# Patient Record
Sex: FEMALE | Race: BLACK OR AFRICAN AMERICAN | Employment: FULL TIME | URBAN - METROPOLITAN AREA
[De-identification: names, ages, dates, MRNs, and addresses within clinical notes are randomized per-mention and may not be internally consistent; named-entity substitution may affect disease eponyms.]

---

## 2019-11-06 ENCOUNTER — HOSPITAL ENCOUNTER (OUTPATIENT)
Dept: NUTRITION | Age: 59
Discharge: HOME | End: 2019-11-06
Payer: COMMERCIAL

## 2019-11-06 PROCEDURE — 97802 MEDICAL NUTRITION INDIV IN: CPT

## 2019-11-06 NOTE — PROGRESS NOTES
[address] [place] [address] 51, 45 [facility], [address] [place] [address]  Phone: [phone]  Fax: [phone]   Nutrition Assessment  Medical Nutrition Therapy   Outpatient Initial Evaluation         Patient Name: Jarrett Blount : 1960   Treatment Diagnosis: DM2, obesity   Referral Source: [facility]): 2019     Gender: female Age: 62 y.o. Ht: 65 in Wt: 271.6  lb     BMI: 45.3 BMR   Male  BMR Female 1804   Anthropometrics Assessment: Morbid obesity     Past Medical History includes: DM2,      Pertinent Medications:   Metformin, trulicity, glimiperide     Biochemical Data:   No results found for: HBA1C, HGBE8, NEI2TKAX, QYZ2PMEC  No results found for: CHOL, CHOLPOCT, CHOLX, CHLST, CHOLV, HDL, HDLPOC, HDLP, LDL, LDLCPOC, LDLC, DLDLP, VLDLC, VLDL, TGLX, TRIGL, TRIGP, TGLPOCT, CHHD, CHHDX  No results found for: GPT, ALT, SGOT, GGT, GGTP, AP, APIT, APX, CBIL, TBIL, TBILI  No results found for: NERISSA, CREAPOC, ACREA, CREA, REFC3, REFC4  No results found for: BUN, BUNPOC, IBUN, MBUNV, BUNV  No results found for: MCACR, MCA1, MCA2, MCA3, MCAU, MCAU2, MCALPOCT     Subjective/Assessment:   Patient seen today in nutrition for initial assessment for diabetes education. Patient reports in the last 6 months gaining about ~15 pounds and her A1C increasing from 6.9-7.4. She reports this increase is due to her doctor stopping a diabetes medication, jardiance, due to it causing yeast infections. Today our goal is to get her A1C back below 6.9 and to lose weight to initial goal of 200 pounds. She normally does not check her blood sugar. But if she does lately it has been in the 150's fasting in the morning. Nutrition Diagnosis:  Excessive carbohydrate intake related to food and nutrition related knowledge deficit as evidenced by elevated A1C.     Nutrition Diagnosis: Obesity related to excessive energy intake and physical inactivity as evidenced by elevated BMI, over consumption of calories and infrequent physical activity. Current Eating Patterns: Diet recall:  Breakfast: 16 oz hot chocolate, vasques and egg sandwich with 2 slices bread  Lunch: (12:00) PBJ sandwich and cup fruit  Snack: normally nothing unless she feels her blood sugar is dropping and then she will have a cup of fruit  Dinner: (9:00) fast food: 2 fish sandwiches with only 1 of the buns or chick umang chicken noodle soup or panera salad with chicken  Snack: occasionally she will have fruit and or crackers    On the weekend she eats out for breakfast and normally 1 other meal at least 1 day. She still eats 3 meals per day. Walking occasionally but limited due to joint pain. Fluids: water, hot chocolate and occasionally soda  Lactose intolerant  Supplements: vitamin D weekly       Estimate Needs   Calories:  1800 Protein: 60-80 Carbs: 120     Kcal/day  g/day  g/day      Education provided:   Intervention:  Education, Comprehensive - reviewed prescription below. Nutrition education provided on medical nutrition therapy for type 2 Diabetes Mellitus. Educated patient on pathophysiology of Type II diabetes and the rational for dietary modifications to include more fiber, protein and modified carbohydrate consumption. Provided education on food label reading, food groups (carbohydrates, proteins and non-starchy vegetables), and meal planning for diabetes control. Individual carbohydrate goals were assessed and provided to the patient. We discussed the importance of having a protein source with each meal and snack to help with blood sugar control and hunger. Meal and snack times were discussed. Encouraged patient to eat within 2 hours of waking and not go longer than 4-5 hours between meals. Reviewed sample meal plan, tips for eating out and grocery shopping. The patient was provided with my contact info. Patient asked questions and indicated understanding.        Handouts Provided: [x]  Carbohydrates  [x]  Protein  [x]  Fiber  [x]  Serving Sizes  [x]  Meal Ideas  [x]  Non-starchy Vegetables []  Diabetes  []  Cholesterol  []  Sodium  [x]  Food labels  []  Snacks  []  Others:   Information Reviewed with: Patient   Readiness to Change Stage: []  Pre-contemplative    [x]  Contemplative  []  Preparation               []  Action                  []  Maintenance   Potential Barriers to Learning: []  Decline in memory    []  Language barrier   []  Other:  []  Emotional                  []  Limited mobility  []  Lack of motivation     [] Vision, hearing or cognitive impairment   Expected Compliance: Good      Nutritional Goal - To promote lifestyle changes to result in:    [x]  Weight loss  [x]  Improved diabetic control  []  Decreased cholesterol levels  []  Decreased blood pressure  []  Weight maintenance []  Preventing any interactions associated with food allergies  []  Adequate weight gain toward goal weight  []  Other:        Nutrition Prescription/  Patient Goals:    Limit intake of carbohydrates to no more than. o 120 grams of carbohydrate per day. o 30-45 grams of carbohydrate per meal.  o 0-15 grams of carbohydrate per snack.  Have 3 meals per day and 1-2 snacks (as needed).  Aim for at least 3 ounces (deck of cards) of a protein choice at every meal and 1-2 ounces at snack times.  Choose one or more vegetables to include with lunch, dinner and snacks (as needed).  Have at least 64 ounces of fluids per day. Choose sugar-free beverages only.       Exercise: 5 minutes 3-5 days per week     Change to sugar free hot chocolate   Add in afternoon snack (protein and vegetable and 1 carb serving if needed)     Dietitian Signature: Violette Lozano MS, RD Date: 11/6/2019   Follow-up: December 11 th at 2:00 at [facility] Time: 3:35 PM

## 2019-11-13 ENCOUNTER — HOSPITAL ENCOUNTER (OUTPATIENT)
Dept: NUTRITION | Age: 59
Discharge: HOME | End: 2019-11-13
Payer: COMMERCIAL

## 2019-11-13 PROCEDURE — G0109 DIAB MANAGE TRN IND/GROUP: HCPCS

## 2019-11-20 ENCOUNTER — HOSPITAL ENCOUNTER (OUTPATIENT)
Dept: NUTRITION | Age: 59
Discharge: HOME | End: 2019-11-20
Payer: COMMERCIAL

## 2019-11-20 PROCEDURE — G0109 DIAB MANAGE TRN IND/GROUP: HCPCS

## 2019-11-21 NOTE — PROGRESS NOTES
[employer] [facility] Outpatient Diabetes Self-Management Education Program      Dsme RECORD and PROGRESS NOTE         Patient: Aria Dockery : 1960         Physician: BRENDA Gould Date: 2019     VISIT No: []1 [x]2 []3 []4 []5 []6               [x] Initial                   [] Subsequent Year ACTUAL hours furnished: PROGRESS NOTE    A1C:   Date A1C drawn:        [] Initial     [] Updated    Height:   Weight:                 Date:   BMI:        [] Initial     [] Updated                   Weight Change:  [] Up    [] Down   lbs (Only for follow-up visit)     Diabetes Meds:  [] None   [x] List: (Provide list at initial visit only list changes at follow-up visits)  [] No change     Metformin, Trulicity, Glymperide  Other Meds:  [x] BP   [x] lipids          [x] depression/anxiety        [] other:        Behavior Goals Set Related To:   (List at initial visit only and add any new goals, if any, from follow-up visits)  [] Healthy eating      [] Being active  [] Taking meds        [] Monitoring   [] Reducing risks     [] Healthy coping  [] Problem-solving  []  Other:    [] Separate Behavior Form Attached (use to assess progress on previously set goals)      [x] Reviewed achievement of goals    Notes: Pt attended Class II of the 3 class DSME series. Pt remains engaged and participated in discussions. Pt will attend Class 3 on Wednesday, 2019.                  [] Program schedule and visit dates                  [] 0.5     [] 1    [] 1.5    [x] 2     [] 2.5     [] 3    Format:   [] Individual       [x] Group    Also present:   []  Family:     [] Friend:     [] Caregiver:        [] Other:                  [] Behavior goals:              [] set goals  [] review achievement     [] Prediabetes and Diabetes biology & diagnosis     [] BG goals and targets        [] A1c test, meaning and goal     [] Diabetes causes & risk factors        [] Purpose of Healthy Eating & tips     [] Body weight: [] healthy wt             [] risks when overwt/obese     [] Carbohydrate food sources & counting     [] Lean proteins & healthy fats     [] How foods/drinks affect BG     [] How to read a Food Label     [] Healthy Plate Method     [] Benefits of Being Active     [] Different types of exercise     [] Recommendations & safety issues for exercise     [x] Diabetes identification (on body, in wallet/purse)     [x] Obstacles to behavior change     [x] S.M.A.R.T.  Goals     [x] Taking medications:      [x] oral   [x]  injections   [x] insulin     [x] Understanding types of meds     [x] Proper storage and handling of meds     [x] OTC and alternative meds     [x] Purpose & benefits of Monitoring     [x] Causes, prevention & treatment for hypo- & hyperglycemia     [x] General guidelines for monitoring     [x] SMBG and monitoring schedule     [] BG logs       [] reviewed logs     [x] Pattern management with SMBG log     [] BG meter receipt              Type/name of:                                    [] BG meter review     [] Reducing risks to prevent chronic complications     []  Monitor health status:             []  Eye exam    [] BP                        []  Lipids/heart disease            []  Nerve/Neuropathy             [] Foot care              []  Dental      [] Sleep                    []  Kidney      [] other:     [] Obstacles to behavior change      [] Problem solving skills to overcome barriers and challenges     [] The process and tips for problem solving     [] Problem solving for hypo- & hyperglycemia     [] Sick Day Management     [] Healthy Coping & stress management     [] DSM support plan      [] Individual assessment     [] Individual education plan and needs     []  Review initial assessment; correct knowledge deficits      []  Patient-selected topics:          []  Other topic(s):           Educator Signature: Tonja Alvarado RD, 11/20/2019 7:02 PM

## 2019-12-04 ENCOUNTER — HOSPITAL ENCOUNTER (OUTPATIENT)
Dept: NUTRITION | Age: 59
Discharge: HOME | End: 2019-12-04
Payer: COMMERCIAL

## 2019-12-04 PROCEDURE — G0109 DIAB MANAGE TRN IND/GROUP: HCPCS

## 2019-12-05 NOTE — PROGRESS NOTES
[facility] Outpatient Diabetes Self-Management Education Program      Dsme RECORD and PROGRESS NOTE         Patient: Jack Rodriges : 1960         Physician: BRENDA Ivory Date: 2019     VISIT No: []1 []2 [x]3 []4 []5 []6               [x] Initial                   [] Subsequent Year ACTUAL hours furnished: PROGRESS NOTE    A1C:     Date A1C drawn: Due 2020       [] Initial     [] Updated    Height: 65  Weight: 270.2              Date: 2019  BMI: 45       [x] Initial     [] Updated                   Weight Change:  [] Up    [x] Down   1.4 lbs (Only for follow-up visit)     Diabetes Meds:  [] None   [] List: (Provide list at initial visit only list changes at follow-up visits)  [x] No change       Other Meds:  [] BP   [] lipids          [] depression/anxiety        [x] other: No change      Behavior Goals Set Related To:   (List at initial visit only and add any new goals, if any, from follow-up visits)  [] Healthy eating      [] Being active  [] Taking meds        [] Monitoring   [] Reducing risks     [] Healthy coping  [] Problem-solving  []  Other:    [x] Separate Behavior Form Attached (use to assess progress on previously set goals)      [x] Reviewed achievement of goals    Notes: Pt attended Class III of the 3 class DSME series. Pt attended all 3 classes and remains engaged and motivated. Pt will follow up with her registered dietitian on 2019.                    [] Program schedule and visit dates                  [] 0.5     [] 1    [] 1.5    [x] 2     [] 2.5     [] 3    Format:   [] Individual       [x] Group    Also present:   []  Family:     [] Friend:     [] Caregiver:        [] Other:                  [] Behavior goals:              [] set goals  [] review achievement     [] Prediabetes and Diabetes biology & diagnosis     [] BG goals and targets        [] A1c test, meaning and goal     [] Diabetes causes & risk factors        [] Purpose of Healthy Eating & tips     [] Body weight: [] healthy wt             [] risks when overwt/obese     [] Carbohydrate food sources & counting     [] Lean proteins & healthy fats     [] How foods/drinks affect BG     [] How to read a Food Label     [] Healthy Plate Method     [] Benefits of Being Active     [] Different types of exercise     [] Recommendations & safety issues for exercise     [x] Diabetes identification (on body, in wallet/purse)     [x] Obstacles to behavior change     [x] S.M.A.R.T.  Goals     [] Taking medications:      [] oral   []  injections   [] insulin     [] Understanding types of meds     [] Proper storage and handling of meds     [] OTC and alternative meds     [] Purpose & benefits of Monitoring     [x] Causes, prevention & treatment for hypo- & hyperglycemia     [] General guidelines for monitoring     [] SMBG and monitoring schedule     [] BG logs       [] reviewed logs     [] Pattern management with SMBG log     [] BG meter receipt              Type/name of:                                    [] BG meter review     [x] Reducing risks to prevent chronic complications     []  Monitor health status:             [x]  Eye exam    [x] BP                        [x]  Lipids/heart disease            [x]  Nerve/Neuropathy             [x] Foot care              [x]  Dental      [x] Sleep                    [x]  Kidney      [] other:     [x] Obstacles to behavior change      [x] Problem solving skills to overcome barriers and challenges     [x] The process and tips for problem solving     [x] Problem solving for hypo- & hyperglycemia     [x] Sick Day Management     [x] Healthy Coping & stress management     [x] DSM support plan      [] Individual assessment     [] Individual education plan and needs     []  Review initial assessment; correct knowledge deficits      []  Patient-selected topics:          []  Other topic(s):           Educator Signature: Huan Hoover RD, 12/4/2019 7:05 PM

## 2019-12-11 ENCOUNTER — HOSPITAL ENCOUNTER (OUTPATIENT)
Dept: NUTRITION | Age: 59
Discharge: HOME | End: 2019-12-11
Payer: COMMERCIAL

## 2019-12-11 PROCEDURE — 97803 MED NUTRITION INDIV SUBSEQ: CPT

## 2019-12-11 NOTE — PROGRESS NOTES
NUTRITION  FOLLOW-UP TREATMENT NOTE  Patient Name: Giovanna Jones         Date: 2019  : 1960    YES/NO Patient  Verified  Diagnosis:DM2, obesity   In time:   12:00             Out time:   12:30   30 minutes     SUBJECTIVE/ASSESSMENT    Changes in medication or medical history? Any new allergies, surgeries or procedures? YES/NO    If yes, update Summary List   Patient seen today in nutrition for follow up diabetes management. She reports not checking her blood sugar lately as she has run out of test strips to her old meter and she cannot find the new meter she was sent. She did attend and completed the DSME class. Diet recall:  Breakfast: vasques and egg sandwich with water or hot chocolate, breakfast sandwich and donut  Lunch: PBJ sandwich  Snack: mandarin oranges or applesauce  Dinner: (late 9:00) chick umang chicken noodle soup with 5 crackers, hamburger and noodles with 2 chicken wings    She reports eating out often as she does not have time nor want to cook when she gets home so late. No exercise. Fluids: water, hot chocolate     Current Wt: 270.2 Previous Wt: 271.6 Wt Change: -1.6#     Initial Wt: 271.6 Total Wt change: -1.6# Height: 65     Nutrition Diagnosis        Diagnosis Status:   Nutrition Diagnosis:  Excessive carbohydrate intake related to food and nutrition related knowledge deficit as evidenced by elevated A1C.     Nutrition Diagnosis: Obesity related to excessive energy intake and physical inactivity as evidenced by elevated BMI, over consumption of calories and infrequent physical activity. []  Improved [x]  No Change    []  Declined        Achievement of Goals: · Limit intake of carbohydrates to no more than. Improved - continue  ? 120 grams of carbohydrate per day. ? 30-45 grams of carbohydrate per meal.  ? 0-15 grams of carbohydrate per snack.     · Have 3 meals per day and 1-2 snacks (as needed).  Improved - continue     · Aim for at least 3 ounces (deck of cards) of a protein choice at every meal and 1-2 ounces at snack times. Improved - continue     · Choose one or more vegetables to include with lunch, dinner and snacks (as needed). Not met - continue     · Have at least 64 ounces of fluids per day. Choose sugar-free beverages only. Improved - continue     · Exercise: 5 minutes 3-5 days per week Not met - continue     · Change to sugar free hot chocolate Not met - continue  · Add in afternoon snack (protein and vegetable and 1 carb serving if needed) Improved - continue     Patient Education:  [x]  Review current plan with patient   []  Other:    Handouts/  Information Provided: []  Carbohydrates  []  Protein  []  Fiber  []  Serving Sizes  []  Fluids  []  Snacks []  Diabetes  []  Cholesterol  []  Sodium  []  SBGM  []  Non starchy vegetables  []  Others:      New Patient Goals: · Find her meter and start checking and recording her blood sugar  · Change to grilled chicken from fried  · Keep food log   · Limit intake of carbohydrates to no more than. ? 30-45 grams of carbohydrate per meal.  ? 0-15 grams of carbohydrate per snack.      PLAN    [x]  Continue on current plan []  Follow-up PRN   []  Discharge due to :    [x]  Next appt: January 21st at 3:30 at [facility]     Dietitian: Neena Ballard MS, RD    Date: 12/11/2019 Time: 2:04 PM